# Patient Record
Sex: MALE | URBAN - METROPOLITAN AREA
[De-identification: names, ages, dates, MRNs, and addresses within clinical notes are randomized per-mention and may not be internally consistent; named-entity substitution may affect disease eponyms.]

---

## 2017-10-30 ENCOUNTER — HOSPITAL ENCOUNTER (EMERGENCY)
Facility: MEDICAL CENTER | Age: 32
End: 2017-10-30

## 2017-10-30 VITALS
WEIGHT: 154.32 LBS | DIASTOLIC BLOOD PRESSURE: 78 MMHG | TEMPERATURE: 98 F | OXYGEN SATURATION: 98 % | HEART RATE: 90 BPM | SYSTOLIC BLOOD PRESSURE: 132 MMHG | RESPIRATION RATE: 18 BRPM

## 2017-10-30 PROCEDURE — 302449 STATCHG TRIAGE ONLY (STATISTIC)

## 2017-10-30 NOTE — ED NOTES
Chief Complaint   Patient presents with   • Detox     pt bib [facility] asking help to detox or get in to any rehab program. last drink was 2 hours ago. had 1 pint of vodka the last 4 hrs. denies any n/v/tremors.   • Alcohol Intoxication     Denies SI/HI. Has bgl of 115

## 2017-10-31 NOTE — ED NOTES
Pt called to be roomed but pt is not in the ER lobby or senior lounge. Will retry to call pt again.